# Patient Record
Sex: MALE | Race: WHITE
[De-identification: names, ages, dates, MRNs, and addresses within clinical notes are randomized per-mention and may not be internally consistent; named-entity substitution may affect disease eponyms.]

---

## 2020-02-20 ENCOUNTER — HOSPITAL ENCOUNTER (OUTPATIENT)
Dept: HOSPITAL 96 - M.RAD | Age: 30
End: 2020-02-20
Attending: INTERNAL MEDICINE
Payer: COMMERCIAL

## 2020-02-20 DIAGNOSIS — R07.89: Primary | ICD-10-CM

## 2020-02-20 DIAGNOSIS — G47.33: ICD-10-CM

## 2020-02-20 DIAGNOSIS — R53.82: ICD-10-CM

## 2020-02-20 DIAGNOSIS — F43.10: ICD-10-CM

## 2020-02-27 ENCOUNTER — HOSPITAL ENCOUNTER (OUTPATIENT)
Dept: HOSPITAL 96 - M.SLEEPLAB | Age: 30
End: 2020-02-27
Attending: INTERNAL MEDICINE
Payer: COMMERCIAL

## 2020-02-27 DIAGNOSIS — F43.10: ICD-10-CM

## 2020-02-27 DIAGNOSIS — G47.33: Primary | ICD-10-CM

## 2020-03-02 NOTE — SLEEP
[address]  [address]                    SLEEP STUDY REPORT            
_______________________________________________________________________________
 
Name:       HOLGUINCLEMENT INDERJIT        Room:                      REG CLI 
M.R.#:  [id number]      Account #:      [id number]  
Admission:  02/27/20     Attend Phys:    Armand Bowden MD 
Discharge:               Date of Birth:  [birth year]  
         Report #: [id number]
                                                                     [id number]  
_______________________________________________________________________________
THIS REPORT FOR:  //name//                      
 
CC: Armand Bowden 
 
This study has been reviewed in its entirety by a board certified sleep
specialist
 
DATE OF SERVICE:  02/27/2020
 
 
HOME SLEEP STUDY
 
ATTENDING PHYSICIAN:  Armand Bowden MD
 
The patient is a 29-year-old who weighs 205 pounds with a BMI of 29.4.  The
patient underwent home sleep study performed by [facility].  Total
recording time was 480 minutes.
 
During the night study, the patient had 162 obstructive apneas, no central or
mixed apneas and 3 hypopneas.  The patient's apnea hypopnea index was 21.3 per
hour with a supine index of 21.3 per hour as well.
 
Nocturnal oximetry study revealed an average oxygen saturation 95% with lowest
of 92%.
 
Mean heart rate was 51 beats per minute with a maximum of 90 beats per minute.
 
IMPRESSION:
1.  Moderate sleep apnea-hypopnea syndrome at an AHI of 21.3 per hour.
2.  No clinically significant nocturnal hypoxia.
 
RECOMMENDATIONS:
1.  The patient would benefit from treatment of sleep apnea with CPAP.  This can
be done as an in-lab titration study versus home auto-titration.
2.  Once the patient is optimally treated, then follow up in 4-6 weeks to assess
compliance with CPAP and to document clinical improvement.
3.  Weight loss is advised.
4.  Avoid CNS depressants.
5.  Cautioned regarding driving until symptoms of sleep apnea resolve with the
use of CPAP.
 
 
 
<ELECTRONICALLY SIGNED>
                                        By:  Hardeep Sylvester MD              
03/02/20     1526
D: 03/02/20 0951_______________________________________
T: 03/02/20 [id number] Sylvester MD                 /nt